# Patient Record
Sex: FEMALE | URBAN - METROPOLITAN AREA
[De-identification: names, ages, dates, MRNs, and addresses within clinical notes are randomized per-mention and may not be internally consistent; named-entity substitution may affect disease eponyms.]

---

## 2023-12-18 ENCOUNTER — HOSPITAL ENCOUNTER (OUTPATIENT)
Dept: TELEMEDICINE | Facility: HOSPITAL | Age: 68
Discharge: HOME | End: 2023-12-18

## 2023-12-18 NOTE — SUBJECTIVE & OBJECTIVE
HPI:  68 y.o. female ***  Tremor in RUE, blurred vision, HA  Images personally reviewed and interpreted:  Study: {Study:44985}  Study Interpretation: ***     Assessment and plan:  ***    Lytics recommendation: {THROMBOLYTIC THERAPY RECOMMENDATION:30195}  Thrombectomy recommendation: {INTERVENTIONAL REVASCULARIZATION CANDIDATE:50311}  Placement recommendation: {TELESTROKE DISPOSITION RECOMMENDATION:37657}